# Patient Record
Sex: FEMALE | Race: WHITE | URBAN - METROPOLITAN AREA
[De-identification: names, ages, dates, MRNs, and addresses within clinical notes are randomized per-mention and may not be internally consistent; named-entity substitution may affect disease eponyms.]

---

## 2017-09-11 ENCOUNTER — TELEPHONE (OUTPATIENT)
Dept: BEHAVIORAL HEALTH | Facility: CLINIC | Age: 27
End: 2017-09-11

## 2017-09-11 NOTE — TELEPHONE ENCOUNTER
S:  9/11/17 Received call from client requesting a Chemical   Dependency evaluation.   B:  Client stated her drug of choice is Meth, using monthly. She   stating having completed an OP [place] program in 2010 at   [facility].   A:  CD evaluation  Legal issues: client stated going through 2 case for 1st degree  Burglary, one in [place] and one in [facility]  R:  Client stated having [facility] insurance, she will call back  with insurance information and scheduling evaluation. RENET

## 2017-09-12 NOTE — TELEPHONE ENCOUNTER
9/12/17 insurance verified, client's [facility] insurance is not active at this time, client has MA.  Left VM for client that evaluation appointment will be canceled at this time. Kathya number was provided so client to discuss her financial situation. ANDRES